# Patient Record
Sex: FEMALE | Race: WHITE
[De-identification: names, ages, dates, MRNs, and addresses within clinical notes are randomized per-mention and may not be internally consistent; named-entity substitution may affect disease eponyms.]

---

## 2017-07-03 ENCOUNTER — HOSPITAL ENCOUNTER (EMERGENCY)
Dept: HOSPITAL 74 - JER | Age: 57
LOS: 1 days | Discharge: HOME | End: 2017-07-04
Payer: COMMERCIAL

## 2017-07-03 VITALS — BODY MASS INDEX: 34.7 KG/M2

## 2017-07-03 VITALS — HEART RATE: 85 BPM | TEMPERATURE: 97.7 F

## 2017-07-03 DIAGNOSIS — E11.9: ICD-10-CM

## 2017-07-03 DIAGNOSIS — G89.29: ICD-10-CM

## 2017-07-03 DIAGNOSIS — I10: ICD-10-CM

## 2017-07-03 DIAGNOSIS — F32.9: ICD-10-CM

## 2017-07-03 DIAGNOSIS — M54.5: Primary | ICD-10-CM

## 2017-07-03 DIAGNOSIS — E78.00: ICD-10-CM

## 2017-07-03 LAB
ALBUMIN SERPL-MCNC: 3.4 G/DL (ref 3.4–5)
ALP SERPL-CCNC: 91 U/L (ref 45–117)
ALT SERPL-CCNC: 29 U/L (ref 12–78)
ANION GAP SERPL CALC-SCNC: 8 MMOL/L (ref 8–16)
AST SERPL-CCNC: 15 U/L (ref 15–37)
BASOPHILS # BLD: 0.5 % (ref 0–2)
BILIRUB SERPL-MCNC: 0.2 MG/DL (ref 0.2–1)
CALCIUM SERPL-MCNC: 9.5 MG/DL (ref 8.5–10.1)
CO2 SERPL-SCNC: 30 MMOL/L (ref 21–32)
CREAT SERPL-MCNC: 0.7 MG/DL (ref 0.55–1.02)
DEPRECATED RDW RBC AUTO: 14.1 % (ref 11.6–15.6)
EOSINOPHIL # BLD: 0.1 % (ref 0–4.5)
GLUCOSE SERPL-MCNC: 210 MG/DL (ref 74–106)
MCH RBC QN AUTO: 29.2 PG (ref 25.7–33.7)
MCHC RBC AUTO-ENTMCNC: 32.8 G/DL (ref 32–36)
MCV RBC: 89 FL (ref 80–96)
NEUTROPHILS # BLD: 79.3 % (ref 42.8–82.8)
PLATELET # BLD AUTO: 254 K/MM3 (ref 134–434)
PMV BLD: 9.7 FL (ref 7.5–11.1)
PROT SERPL-MCNC: 6.5 G/DL (ref 6.4–8.2)
TROPONIN I SERPL-MCNC: < 0.02 NG/ML (ref 0–0.05)
WBC # BLD AUTO: 14.9 K/MM3 (ref 4–10)

## 2017-07-03 PROCEDURE — 3E033NZ INTRODUCTION OF ANALGESICS, HYPNOTICS, SEDATIVES INTO PERIPHERAL VEIN, PERCUTANEOUS APPROACH: ICD-10-PCS

## 2017-07-03 PROCEDURE — 3E033GC INTRODUCTION OF OTHER THERAPEUTIC SUBSTANCE INTO PERIPHERAL VEIN, PERCUTANEOUS APPROACH: ICD-10-PCS

## 2017-07-03 NOTE — PDOC
History of Present Illness





- General


Chief Complaint: Back Pain


Stated Complaint: BACK PAIN/BLOOD PRESSURE PROBLEM


Time Seen by Provider: 07/03/17 21:22


History Source: Patient, Family,  Used (Google Translate.)


Exam Limitations: Language Barrier





- History of Present Illness


Initial Comments: 


07/03/17 21:38





57yo Female patient w/ PmHx: HTN, HLD, NIDDM, Appendectomy, Chronic Back Pain 

presents to ED c/o right flank pain x 1 week radiating to groin. Associated 

Nausea and Dizziness. Patient states she has been taking Ibuprofen for pain 

with no relief. She states attending therapy for back pain and was given an 

injection a few days ago which lasted 1 day with pain worsening. Patient also c/

o frequent and increase urination.











Occurred: reports: other (1 week.)


Severity: reports: severe


Pain Location: reports: back


Method of Injury: No: unknown, assault, direct blow, fall, motor vehicle crash, 

other


Modifying Factors: improves with: pain medication


Loss of Consciousness: no loss of consciousness


Associated Symptoms (Fall): dizziness, nausea/vomiting





Past History





- Travel


Traveled outside of the country in the last 30 days: No


Close contact w/someone who was outside of country & ill: No





- Past Medical History


Allergies/Adverse Reactions: 


 Allergies











Allergy/AdvReac Type Severity Reaction Status Date / Time


 


No Known Drug Allergies Allergy   Verified 07/03/17 19:51











Home Medications: 


Ambulatory Orders





Aspirin Coated [Ecotrin -] 81 mg PO DAILY 01/31/14 


Metoprolol Tartrate [Lopressor -] 100 mg PO DAILY 01/31/14 


Oxycodone HCl [Roxicodone -] 5 mg PO Q4H PRN #20 tablet 01/27/16 


Sennosides [Senna -] 2 tab PO HS PRN #60 tablet 01/27/16 


Azithromycin [Zithromax Tri-Ramiro (3 DAYS) -] 500 mg PO DAILY #3 tablet 04/17/16 


Tramadol HCl 50 mg PO Q6H PRN #20 tablet MDD 4 tabs 07/04/17 








Anemia: No


Asthma: No


Cancer: No


Cardiac Disorders: No


CVA: No


COPD: No


CHF: No


Dementia: No


Diabetes: Yes


GI Disorders: No


 Disorders: Yes (INCONTINENCE)


HTN: Yes


Hypercholesterolemia: No


Liver Disease: No


Psychiatric Problems: Yes (Depression)


Seizures: No


Thyroid Disease: No





- Surgical History


Appendectomy: Yes





- Immunization History


Immunization Up to Date: Yes





- Psycho/Social/Smoking Cessation Hx


Anxiety: No


Suicidal Ideation: No


Smoking History: Never smoked


Have you smoked in the past 12 months: No


Hx Alcohol Use: No


Drug/Substance Use Hx: No


Substance Use Type: None


Hx Substance Use Treatment: No





Trauma Specific PMHX





- Complaint Specific PMHX


Arthritis: No


Back Injury: No


Neck Injury: No


Hx Sacro Iliac Joint Dysfunction: No





**Review of Systems





- Review of Systems


Able to Perform ROS?: Yes


Is the patient limited English proficient: No


Constitutional: No: Chills, Fever


Respiratory: No: Shortness of Breath


Cardiac (ROS): No: Chest Pain, Lightheadedness, Palpitations, Syncope, Chest 

Tightness


ABD/GI: Yes: Nausea.  No: Abdominal Distended, Constipated, Diarrhea, Poor 

Appetite, Poor Fluid Intake, Vomiting, Abdominal cramping


: Yes: Frequency, Flank Pain.  No: Dysuria, Hematuria, Pain, Urgency


Musculoskeletal: Yes: Back Pain


All Other Systems: Reviewed and Negative





*Physical Exam





- Vital Signs


 Last Vital Signs











Temp Pulse Resp BP Pulse Ox


 


 97.7 F   85   18   150/78   98 


 


 07/03/17 19:49  07/03/17 19:49  07/03/17 19:49  07/03/17 19:49  07/03/17 19:49














- Physical Exam


General Appearance: Yes: Nourished, Appropriately Dressed, Moderate Distress.  

No: Apparent Distress, Mild Distress, Severe Distress


Respiratory/Chest: positive: Lungs Clear, Normal Breath Sounds.  negative: 

Chest Tender, Respiratory Distress, Accessory Muscle Use, Labored Respiration, 

Rapid RR, Rhonchi, Stridor, Wheezing, Hyperresonant, Dullness, Plerual Rub


Cardiovascular: positive: Regular Rhythm, Regular Rate


Gastrointestinal/Abdominal: positive: Normal Bowel Sounds, Soft.  negative: 

Distended, Guarding, Rebound, Tenderness


Musculoskeletal: positive: Normal Inspection.  negative: CVA Tenderness, 

Vertebral Tenderness


Extremity: positive: Normal Capillary Refill, Normal Inspection, Normal Range 

of Motion.  negative: Pedal Edema, Swelling, Calf Tenderness, Erythema, 

Inflammation


Integumentary: positive: Normal Color, Dry, Warm


Neurologic: positive: CNs II-XII NML intact, Fully Oriented, Alert, Normal Mood/

Affect, Normal Response, Motor Strength 5/5





ED Treatment Course





- LABORATORY


CBC & Chemistry Diagram: 


 07/03/17 22:10





 07/03/17 22:10





Medical Decision Making





- Medical Decision Making





07/04/17 04:53





The Drug Utilization Report below displays all of the controlled substance 

prescriptions, if any, that your patient has filled in the last twelve months. 

The information displayed on this report is compiled from pharmacy submissions 

to the Department, and accurately reflects the information as submitted by the 

pharmacies.





This report was requested by: Jose Garrett | Reference #: [id number]





There are no results for the search terms that you entered.





*DC/Admit/Observation/Transfer


Diagnosis at time of Disposition: 


 Exacerbation of chronic back pain





- Discharge Dispostion


Disposition: HOME


Condition at time of disposition: Improved


Admit: No





- Prescriptions


Prescriptions: 


Tramadol HCl 50 mg PO Q6H PRN #20 tablet MDD 4 tabs


 PRN Reason: Severe Pain





- Referrals


Referrals: 


Jimmie Yoon MD [Staff Physician] - 





- Patient Instructions


Printed Discharge Instructions:  DI for Low Back Pain


Additional Instructions: 


FOLLOW UP WITH DR. YOON (ORTHOPEDIC) OR YOUR ORTHOPEDIST REGARDING BACK PAIN 

NOT RELIEVED BY NORMAL MEANS. TAKE MEDICATIONS AS PRESCRIBED. DO NOT DRIVE, 

DRINK ALCOHOL, OR OPERATE HEAVY MACHINERY WHILE TAKING TRAMADOL. WARM COMPRESS 

AS NEEDED. RETURN IF ANY CONCERNS. YOUR DOCTOR NEEDS TO ORDER MRI IF AN MRI IS 

NEEDED. WE HAVE PROVIDED YOU WITH RESULTS OF YOUR CT-SCAN TONIGHT.


Print Language: ENGLISH

## 2017-07-04 VITALS — DIASTOLIC BLOOD PRESSURE: 85 MMHG | SYSTOLIC BLOOD PRESSURE: 127 MMHG

## 2017-07-04 LAB
APPEARANCE UR: CLEAR
BILIRUB UR STRIP.AUTO-MCNC: NEGATIVE MG/DL
COLOR UR: (no result)
KETONES UR QL STRIP: NEGATIVE
LEUKOCYTE ESTERASE UR QL STRIP.AUTO: NEGATIVE
NITRITE UR QL STRIP: NEGATIVE
PH UR: 8 [PH] (ref 5–8)
PROT UR QL STRIP: (no result)
PROT UR QL STRIP: NEGATIVE
RBC # UR STRIP: NEGATIVE /UL
SP GR UR: 1.02 (ref 1–1.02)
UROBILINOGEN UR STRIP-MCNC: NEGATIVE E.U./DL (ref 0.2–1)

## 2017-11-07 ENCOUNTER — HOSPITAL ENCOUNTER (OUTPATIENT)
Dept: HOSPITAL 74 - JASU-SURG | Age: 57
Discharge: HOME | End: 2017-11-07
Attending: PHYSICAL MEDICINE & REHABILITATION
Payer: COMMERCIAL

## 2017-11-07 VITALS — HEART RATE: 82 BPM | SYSTOLIC BLOOD PRESSURE: 150 MMHG | DIASTOLIC BLOOD PRESSURE: 82 MMHG

## 2017-11-07 VITALS — BODY MASS INDEX: 32.9 KG/M2

## 2017-11-07 VITALS — TEMPERATURE: 97.9 F

## 2017-11-07 DIAGNOSIS — M54.16: Primary | ICD-10-CM

## 2017-11-07 DIAGNOSIS — M54.5: ICD-10-CM

## 2017-11-07 PROCEDURE — 3E0R33Z INTRODUCTION OF ANTI-INFLAMMATORY INTO SPINAL CANAL, PERCUTANEOUS APPROACH: ICD-10-PCS | Performed by: PHYSICAL MEDICINE & REHABILITATION

## 2017-11-07 PROCEDURE — 3E0R3BZ INTRODUCTION OF ANESTHETIC AGENT INTO SPINAL CANAL, PERCUTANEOUS APPROACH: ICD-10-PCS | Performed by: PHYSICAL MEDICINE & REHABILITATION

## 2017-11-07 PROCEDURE — B01BYZZ FLUOROSCOPY OF SPINAL CORD USING OTHER CONTRAST: ICD-10-PCS | Performed by: PHYSICAL MEDICINE & REHABILITATION

## 2017-11-07 NOTE — PROC
Procedure Note


Procedure: 


Date of service: 11/07/2017





Preoperative Diagnosis: Low back pain and lumbar radiculopathy on right 


Postoperative Diagnosis: Same


Procedure Performed: Lumbar Epidural Steroid Injection (LESI) on Right  L4-5

with dye under Fluoroscopy


Anesthesia: Local / MAC


Anesthesiologist: Dr. Fam





Procedure: I discussed with the patient in detail about the risks, benefits, 

and alternatives to treatment not only limited to infection, headache, numbness

, weakness, and injury to nerves, blood vessels and muscles. The patient 

understood, agreed and signed the written consent. The patient was placed in 

the prone position with the head, abdomen and legs supported with the pillows. 

The lumbosacral area was prepped and draped with Betadine times three in a 

sterile fashion. Lumbar vertebrae were identified under the C-arm. At L4-5 

level on the right side, 3 ml of 1 % Lidocaine was infiltrated into the skin 

and subcutaneous tissue. A 3  inch, #20 gauge Tuohy needle was advanced to the 

epidural space with loss of resistance technique under fluoroscopic guidance. 

Aspiration was negative for cerebrospinal fluid and blood. 2ml of Omnipaque (

radio-opaque dye) was injected to confirm the tip of the needle into epidural 

space and spread of dye. There was no CSF or vascular spread. The spread of dye 

was noted cranially and caudally on epidurogram. Aspiration was done again 

which was negative. 


A solution of 2.0 ml of Celestone 2 ml of 0.25% Marcaine  and a total of 4 ml 

was injected slowly. While Tuohy needle was withdrawn 2.0 ml of 1 % Lidocaine 

was infiltrated. Bleeding was checked. Betadine was wiped off. A sterile 

bandage was placed. The patient tolerated the procedure well. There were no 

immediate complications. The patient was transferred to the recovery room. The 

patient was observed for some time and discharged as per ASU criteria. The 

patient was told to apply ice at the injection site. Follow up appointment was 

given and also call my office at [phone]. If there is any problem, call my 

office or report to Emergency Room.





Ted Lindsey M.D.

## 2018-05-20 ENCOUNTER — HOSPITAL ENCOUNTER (EMERGENCY)
Dept: HOSPITAL 74 - JERFT | Age: 58
Discharge: HOME | End: 2018-05-20
Payer: COMMERCIAL

## 2018-05-20 VITALS — DIASTOLIC BLOOD PRESSURE: 78 MMHG | SYSTOLIC BLOOD PRESSURE: 127 MMHG | TEMPERATURE: 98.3 F | HEART RATE: 95 BPM

## 2018-05-20 VITALS — BODY MASS INDEX: 34.7 KG/M2

## 2018-05-20 DIAGNOSIS — I10: ICD-10-CM

## 2018-05-20 DIAGNOSIS — F32.9: ICD-10-CM

## 2018-05-20 DIAGNOSIS — E11.9: ICD-10-CM

## 2018-05-20 DIAGNOSIS — T78.40XA: Primary | ICD-10-CM

## 2018-05-20 DIAGNOSIS — L50.0: ICD-10-CM

## 2018-05-20 DIAGNOSIS — Z79.84: ICD-10-CM

## 2018-05-20 NOTE — PDOC
History of Present Illness





- General


Chief Complaint: Allergic Reaction


Stated Complaint: ALLERGIC REACTION


Time Seen by Provider: 05/20/18 13:09


History Source: Patient





- History of Present Illness


Timing/Duration: reports: other


Location: reports: extremities, torso





Past History





- Past Medical History


Allergies/Adverse Reactions: 


 Allergies











Allergy/AdvReac Type Severity Reaction Status Date / Time


 


No Known Drug Allergies Allergy   Verified 05/20/18 13:03











Home Medications: 


Ambulatory Orders





Aspirin Coated [Ecotrin -] 81 mg PO DAILY 01/31/14 


Chlorthalidone 25 mg PO ASDIR 05/20/18 


Diphenhydramine HCl [Benadryl -] 25 mg PO Q6H #28 capsule 05/20/18 


Escitalopram Oxalate [Lexapro -] 10 mg PO DAILY 05/20/18 


Gabapentin 300 mg PO ASDIR 05/20/18 


Lisinopril [Zestril] 2.5 mg PO ASDIR 05/20/18 


Loratadine [Claritin] 10 mg PO DAILY #14 tablet 05/20/18 


Loratadine [Claritin] 10 mg PO DAILY #14 tablet 05/20/18 


Metformin HCl 850 mg PO ASDIR 05/20/18 








Anemia: No


Asthma: No


Cancer: No


Cardiac Disorders: No


CVA: No


COPD: No


CHF: No


Dementia: No


Diabetes: Yes


GI Disorders: No


 Disorders: Yes (INCONTINENCE)


HTN: Yes


Hypercholesterolemia: No


Liver Disease: No


Psychiatric Problems: Yes (Depression)


Seizures: No


Thyroid Disease: No





- Surgical History


Appendectomy: Yes





- Immunization History


Immunization Up to Date: Yes





- Suicide/Smoking/Psychosocial Hx


Smoking History: Never smoked


Have you smoked in the past 12 months: No


Hx Alcohol Use: No


Drug/Substance Use Hx: No


Substance Use Type: None


Hx Substance Use Treatment: No





**Review of Systems





- Review of Systems


Constitutional: No: Chills, Fever


Respiratory: No: Shortness of Breath, Wheezing


Integumentary: Yes: Pruritus, Rash





*Physical Exam





- Vital Signs


 Last Vital Signs











Temp Pulse Resp BP Pulse Ox


 


 98.3 F   95 H  18   127/78   99 


 


 05/20/18 13:00  05/20/18 13:00  05/20/18 13:00  05/20/18 13:00  05/20/18 13:00














- Physical Exam


General Appearance: Yes: Appropriately Dressed.  No: Apparent Distress


HEENT: positive: Normal Voice.  negative: Muffled/Hoarse voice


Neck: positive: Supple.  negative: Stridor


Respiratory/Chest: negative: Respiratory Distress


Integumentary: positive: Dry, Warm, Hives


Neurologic: positive: Fully Oriented, Alert, Normal Mood/Affect





Medical Decision Making





- Medical Decision Making





05/20/18 13:32


57-year-old female, history of diabetes and hypertension, here with pruritic 

rash 3 days.  Patient has no known drug or food allergies and denies any 

inciting factors at this time.  No sick contacts or recent travel.  No 

respiratory symptoms.  Has not taking anything for rash.  Patient well-

appearing and stable with rash consistent with hives to upper and lower 

extremities and trunk.  Exam otherwise unremarkable.  Dose of Claritin given in 

ED.  DC with same and PMD follow-up as needed





*DC/Admit/Observation/Transfer


Diagnosis at time of Disposition: 


 Hives








- Discharge Dispostion


Disposition: HOME


Condition at time of disposition: Good





- Prescriptions


Prescriptions: 


Diphenhydramine HCl [Benadryl -] 25 mg PO Q6H #28 capsule


Loratadine [Claritin] 10 mg PO DAILY #14 tablet


Loratadine [Claritin] 10 mg PO DAILY #14 tablet





- Referrals





- Patient Instructions


Printed Discharge Instructions:  Hives


Additional Instructions: 


It appears that you have an allergic reaction at this time.  Take Claritin once 

daily as directed and you can take Benadryl at night to assist with sleep as 

needed.


If symptoms worsen, return to ER immediately.  Otherwise follow-up with your PMD





- Post Discharge Activity

## 2018-07-29 ENCOUNTER — HOSPITAL ENCOUNTER (EMERGENCY)
Dept: HOSPITAL 74 - JER | Age: 58
LOS: 1 days | Discharge: HOME | End: 2018-07-30
Payer: COMMERCIAL

## 2018-07-29 VITALS — BODY MASS INDEX: 32.9 KG/M2

## 2018-07-29 DIAGNOSIS — N39.0: Primary | ICD-10-CM

## 2018-07-29 DIAGNOSIS — R19.7: ICD-10-CM

## 2018-07-29 PROCEDURE — 3E033GC INTRODUCTION OF OTHER THERAPEUTIC SUBSTANCE INTO PERIPHERAL VEIN, PERCUTANEOUS APPROACH: ICD-10-PCS

## 2018-07-29 PROCEDURE — 3E0337Z INTRODUCTION OF ELECTROLYTIC AND WATER BALANCE SUBSTANCE INTO PERIPHERAL VEIN, PERCUTANEOUS APPROACH: ICD-10-PCS

## 2018-07-29 PROCEDURE — 3E03329 INTRODUCTION OF OTHER ANTI-INFECTIVE INTO PERIPHERAL VEIN, PERCUTANEOUS APPROACH: ICD-10-PCS

## 2018-07-29 PROCEDURE — 3E02329 INTRODUCTION OF OTHER ANTI-INFECTIVE INTO MUSCLE, PERCUTANEOUS APPROACH: ICD-10-PCS

## 2018-07-29 NOTE — PDOC
History of Present Illness





<DilshadRosyaayush Griffin - Last Filed: 07/29/18 23:55>





- General


History Source: Patient


Exam Limitations: No Limitations





- History of Present Illness


Initial Comments: 





07/30/18 00:33


The patient is a 57 year old female, with a significant PMH of HDL non insulin 

independent diabetes, HTN and depression who presents to the emergency 

department with nausea and vomiting that began 1 day ago. The patient states 

nausea and vomiting is accompanied with RUQ/RLQ pain, chills, dysuria  and  3 

loose stools. The patient mentions  she went to her clinic yesterday and was 

prescribed pyridium. The patient denies chest pain, shortness of breath, 

headache and dizziness.Denies, constipation, frequency, urgency and hematuria.





Allergies: NKDA


Past surgical history: Appendectomy 


Social history: None reported


PCP: None reported 








<Husam Vera - Last Filed: 07/30/18 00:33>





<Karine Fletcher - Last Filed: 07/30/18 06:09>





- General


Chief Complaint: Pain


Stated Complaint: VOMIT/STOMACH PAIN


Time Seen by Provider: 07/29/18 23:37





Past History





- Past Medical History


Anemia: No


Asthma: No


Cancer: No


Cardiac Disorders: No


CVA: No


COPD: No


CHF: No


DVT: No


Dementia: No


Diabetes: Yes (NIDDM)


GI Disorders: No


 Disorders: Yes (INCONTINENCE)


HTN: Yes


Hypercholesterolemia: Yes


Liver Disease: No


Psychiatric Problems: Yes (Depression)


Seizures: No


Thyroid Disease: No





- Surgical History


Appendectomy: Yes





- Immunization History


Immunization Up to Date: Yes





- Suicide/Smoking/Psychosocial Hx


Smoking History: Never smoked


Have you smoked in the past 12 months: No


Information on smoking cessation initiated: No


Hx Alcohol Use: No


Drug/Substance Use Hx: No


Substance Use Type: None


Hx Substance Use Treatment: No





<Rosy Yung - Last Filed: 07/29/18 23:55>





<Husam Vera - Last Filed: 07/30/18 00:33>





<Karine Fletcher - Last Filed: 07/30/18 06:09>





- Past Medical History


Allergies/Adverse Reactions: 


 Allergies











Allergy/AdvReac Type Severity Reaction Status Date / Time


 


No Known Drug Allergies Allergy   Verified 05/20/18 13:03











Home Medications: 


Ambulatory Orders





Aspirin Coated [Ecotrin -] 81 mg PO DAILY 01/31/14 


Chlorthalidone 25 mg PO ASDIR 05/20/18 


Diphenhydramine HCl [Benadryl -] 25 mg PO Q6H #28 capsule 05/20/18 


Escitalopram Oxalate [Lexapro -] 10 mg PO DAILY 05/20/18 


Gabapentin 300 mg PO ASDIR 05/20/18 


Lisinopril [Zestril] 2.5 mg PO ASDIR 05/20/18 


Loratadine [Claritin] 10 mg PO DAILY #14 tablet 05/20/18 


Loratadine [Claritin] 10 mg PO DAILY #14 tablet 05/20/18 


Metformin HCl 850 mg PO ASDIR 05/20/18 


Cephalexin [Keflex] 500 mg PO TID #21 capsule 07/30/18 











**Review of Systems





- Review of Systems


Able to Perform ROS?: Yes


Comments:: 





07/30/18 00:33


CONSTITUTIONAL: 


Present: Fever, chills


Absent: diaphoresis, generalized weakness, malaise, loss of appetite


HEENT: 


Absent: rhinorrhea, nasal congestion, throat pain, throat swelling, difficulty 

swallowing, mouth swelling, ear pain, eye pain, visual Changes


CARDIOVASCULAR: 


Absent: chest pain, syncope, palpitations, irregular heart rate, lightheadedness

, peripheral edema


RESPIRATORY: 


Absent: cough, shortness of breath, dyspnea with exertion, orthopnea, wheezing, 

stridor, hemoptysis


GASTROINTESTINAL:


Present: Nausea, vomiting


Absent: abdominal pain, abdominal distension,diarrhea, constipation, melena, 

hematochezia


GENITOURINARY: 


Present:dysuria 


Absent:  frequency, urgency, hesitancy, hematuria, flank pain, genital pain


SKIN: 


Absent: rash, itching, pallor


ENDOCRINE:


Absent: unexplained weight gain, unexplained weight loss, heat intolerance, 

cold intolerance


NEUROLOGIC: 


Absent: headache, focal weakness or paresthesias, dizziness, unsteady gait, 

seizure, mental status changes, bladder or bowel incontinence


PSYCHIATRIC: 


Absent: anxiety, depression, suicidal or homicidal ideation, hallucinations.











<Husam Vera - Last Filed: 07/30/18 00:33>





*Physical Exam





- Vital Signs


 Last Vital Signs











Temp Pulse Resp BP Pulse Ox


 


 98.1 F   93 H  20   149/86   98 


 


 07/29/18 22:17  07/29/18 22:17  07/29/18 22:17  07/29/18 22:17  07/29/18 22:17














<Rosy Yung - Last Filed: 07/29/18 23:55>





- Vital Signs


 Last Vital Signs











Temp Pulse Resp BP Pulse Ox


 


 98.1 F   93 H  20   149/86   98 


 


 07/29/18 22:17  07/29/18 22:17  07/29/18 22:17  07/29/18 22:17  07/29/18 22:17














- Physical Exam


Comments: 





07/30/18 00:34


GENERAL:


Well developed, well nourished. Awake and alert. No acute distress.


HEENT:


Normocephalic, atraumatic. PERRLA, EOMI. No conjunctival pallor. Sclera are non-

icteric. Moist mucous membranes. Oropharynx is clear.


NECK: 


Supple. Full ROM. No JVD. Carotid pulses 2+ and symmetric, without bruits. No 

thyromegaly. No lymphadenopathy.


CARDIOVASCULAR:


Regular rate and rhythm. No murmurs, rubs, or gallops. Distal pulses are 2+ and 

symmetric. 


PULMONARY: 


No evidence of respiratory distress. Lungs clear to auscultation bilaterally. 

No wheezing, rales or rhonchi.


ABDOMINAL:


+RUQ/ RLQ tenderness to palpation. Soft. No rebound or guarding. No 

organomegaly. Normoactive bowel sounds. 


MUSCULOSKELETAL 


Normal range of motion at all joints. No bony deformities or tenderness. No CVA 

tenderness.


EXTREMITIES: 


No cyanosis. No clubbing. No edema. No calf tenderness.


SKIN: 


Warm and dry. Normal capillary refill. No rashes. No jaundice. 


NEUROLOGICAL: 


Alert, awake, appropriate. Cranial nerves 2-12 intact. No deficits to light 

touch and temperature in face, upper extremities and lower extremities. 

Normoreflexic in the upper and lower extremities. Normal speech. 


PSYCHIATRIC: 


Cooperative. Good eye contact. Appropriate mood and affect.








<Husam Vera - Last Filed: 07/30/18 00:33>





- Vital Signs


 Last Vital Signs











Temp Pulse Resp BP Pulse Ox


 


 98.1 F   93 H  20   149/86   98 


 


 07/29/18 22:17  07/29/18 22:17  07/29/18 22:17  07/29/18 22:17  07/29/18 22:17














<Karine Fletcher - Last Filed: 07/30/18 06:09>





ED Treatment Course





- LABORATORY


CBC & Chemistry Diagram: 


 07/30/18 01:28





 07/30/18 01:28





- ADDITIONAL ORDERS


Additional order review: 


 Laboratory  Results











  07/30/18 07/30/18





  01:28 01:28


 


Sodium  144 


 


Potassium  3.5 


 


Chloride  105 


 


Carbon Dioxide  30 


 


Anion Gap  9 


 


BUN  17 


 


Creatinine  0.7 


 


Creat Clearance w eGFR  > 60 


 


Random Glucose  140 H 


 


Calcium  9.7 


 


Total Bilirubin  0.4 


 


AST  19 


 


ALT  32 


 


Alkaline Phosphatase  122 H 


 


Total Protein  7.0 


 


Albumin  3.8 


 


Lipase  85 


 


Urine Color   Inessa


 


Urine Appearance   Clear


 


Urine pH   6.0  D


 


Ur Specific Gravity   1.024


 


Urine Protein   Negative


 


Urine Glucose (UA)   Negative


 


Urine Ketones   Negative


 


Urine Blood   Negative


 


Urine Nitrite   Positive


 


Urine Bilirubin   Negative


 


Urine Urobilinogen   4.0 e.u/dl H


 


Ur Leukocyte Esterase   Negative


 


Urine WBC (Auto)   13


 


Urine RBC (Auto)   1


 


Ur Epithelial Cells   Few


 


Urine Mucus   Few








 











  07/30/18





  01:28


 


RBC  4.75


 


MCV  87.6


 


MCHC  34.2


 


RDW  13.7


 


MPV  9.6


 


Neutrophils %  84.5 H


 


Lymphocytes %  10.9  D


 


Monocytes %  4.0


 


Eosinophils %  0.4  D


 


Basophils %  0.2














- Medications


Given in the ED: 


ED Medications














Discontinued Medications














Generic Name Dose Route Start Last Admin





  Trade Name Erick  PRN Reason Stop Dose Admin


 


Sodium Chloride  1,000 mls @ 1,000 mls/hr  07/29/18 23:55  07/30/18 01:23





  Normal Saline -  IV  07/30/18 00:54  1,000 mls/hr





  ASDIR STA   Administration





     





     





     





     


 


Morphine Sulfate  2 mg  07/29/18 23:55  07/30/18 01:32





  Morphine Injection -  IVPUSH  07/29/18 23:56  2 mg





  ONCE STA   Administration





     





     





     





     


 


Ondansetron HCl  4 mg  07/29/18 23:55  07/30/18 01:33





  Zofran Injection  IVPUSH  07/29/18 23:56  4 mg





  ONCE STA   Administration





     





     





     





     














<Karine Fletcher - Last Filed: 07/30/18 06:09>





Medical Decision Making





- Medical Decision Making





07/30/18 05:54


Patient Name: MEGHNA CALIXTO


THIS IS A PRELIMINARY REPORT FROM IMAGING ON CALL


DATE OF SERVICE: 2018-07-30 02:24:25


IMAGES: [id number]


EXAM: ABDOMEN \T\ PELVIS CT WITH CONTR


HISTORY: Right lower quadrant pain


COMPARISON: None.


FINDINGS: Lung bases are clear. The visualized cardiac chambers are normal size 

and


configuration. Normal liver, gallbladder, pancreas, spleen, adrenal glands and 

kidneys. The


stomach and small bowel bowel are normal. There is scattered liquid stool 

without colonic wall


thickening which may indicate a diarrheal illness. There is no aortic aneurysm. 

There is no


significant retroperitoneal lymphadenopathy.


Status post appendectomy. The uterus and adnexal structures are normal. Urinary 

bladder is


unremarkable. There is no pelvic free fluid. No discrete pelvic lymphadenopathy 

is identified.


IMPRESSION: Possible diarrheal illness without colonic wall thickening.


Individualized dose optimization techniques were used for this CT.





07/30/18 06:08


Pt will be treated with rocephin in the ER for UTI and she will go home with 

keflex.





<Karine Fletcher - Last Filed: 07/30/18 06:09>





*DC/Admit/Observation/Transfer





<Rosy Yung - Last Filed: 07/29/18 23:55>





- Attestations


Scribe Attestion: 





07/30/18 00:35





Documentation prepared by Husam Vera, acting as medical scribe for Roys Yung MD.





<Husam Vera - Last Filed: 07/30/18 00:33>





- Discharge Dispostion


Decision to Admit order: No





<Karine Fletcher - Last Filed: 07/30/18 06:09>


Diagnosis at time of Disposition: 


 Diarrhea, UTI (urinary tract infection)








- Discharge Dispostion


Disposition: HOME


Condition at time of disposition: Stable





- Prescriptions


Prescriptions: 


Cephalexin [Keflex] 500 mg PO TID #21 capsule





- Referrals


Referrals: 


Evette Rendon [Primary Care Provider] - 





- Patient Instructions


Printed Discharge Instructions:  Urinary Tract Infection, Diarrhea, Probiotics 

May Decrease Intensity and Duration of Diarrhea Due to Infection





- Post Discharge Activity

## 2018-07-30 VITALS — SYSTOLIC BLOOD PRESSURE: 129 MMHG | TEMPERATURE: 97.9 F | DIASTOLIC BLOOD PRESSURE: 72 MMHG | HEART RATE: 78 BPM

## 2018-07-30 LAB
ALBUMIN SERPL-MCNC: 3.8 G/DL (ref 3.4–5)
ALP SERPL-CCNC: 122 U/L (ref 45–117)
ALT SERPL-CCNC: 32 U/L (ref 12–78)
ANION GAP SERPL CALC-SCNC: 9 MMOL/L (ref 8–16)
APPEARANCE UR: CLEAR
AST SERPL-CCNC: 19 U/L (ref 15–37)
BASOPHILS # BLD: 0.2 % (ref 0–2)
BILIRUB SERPL-MCNC: 0.4 MG/DL (ref 0.2–1)
BILIRUB UR STRIP.AUTO-MCNC: NEGATIVE MG/DL
BUN SERPL-MCNC: 17 MG/DL (ref 7–18)
CALCIUM SERPL-MCNC: 9.7 MG/DL (ref 8.5–10.1)
CHLORIDE SERPL-SCNC: 105 MMOL/L (ref 98–107)
CO2 SERPL-SCNC: 30 MMOL/L (ref 21–32)
COLOR UR: (no result)
CREAT SERPL-MCNC: 0.7 MG/DL (ref 0.55–1.02)
DEPRECATED RDW RBC AUTO: 13.7 % (ref 11.6–15.6)
EOSINOPHIL # BLD: 0.4 % (ref 0–4.5)
EPITH CASTS URNS QL MICRO: (no result) /HPF
GLUCOSE SERPL-MCNC: 140 MG/DL (ref 74–106)
HCT VFR BLD CALC: 41.6 % (ref 32.4–45.2)
HGB BLD-MCNC: 14.2 GM/DL (ref 10.7–15.3)
KETONES UR QL STRIP: NEGATIVE
LEUKOCYTE ESTERASE UR QL STRIP.AUTO: NEGATIVE
LIPASE SERPL-CCNC: 85 U/L (ref 73–393)
LYMPHOCYTES # BLD: 10.9 % (ref 8–40)
MCH RBC QN AUTO: 29.9 PG (ref 25.7–33.7)
MCHC RBC AUTO-ENTMCNC: 34.2 G/DL (ref 32–36)
MCV RBC: 87.6 FL (ref 80–96)
MONOCYTES # BLD AUTO: 4 % (ref 3.8–10.2)
MUCOUS THREADS URNS QL MICRO: (no result)
NEUTROPHILS # BLD: 84.5 % (ref 42.8–82.8)
NITRITE UR QL STRIP: POSITIVE
PH UR: 6 [PH] (ref 5–8)
PLATELET # BLD AUTO: 237 K/MM3 (ref 134–434)
PMV BLD: 9.6 FL (ref 7.5–11.1)
POTASSIUM SERPLBLD-SCNC: 3.5 MMOL/L (ref 3.5–5.1)
PROT SERPL-MCNC: 7 G/DL (ref 6.4–8.2)
PROT UR QL STRIP: NEGATIVE
PROT UR QL STRIP: NEGATIVE
RBC # BLD AUTO: 4.75 M/MM3 (ref 3.6–5.2)
SODIUM SERPL-SCNC: 144 MMOL/L (ref 136–145)
SP GR UR: 1.02 (ref 1–1.03)
UROBILINOGEN UR STRIP-MCNC: (no result) MG/DL (ref 0.2–1)
WBC # BLD AUTO: 10.8 K/MM3 (ref 4–10)

## 2018-11-19 ENCOUNTER — HOSPITAL ENCOUNTER (EMERGENCY)
Dept: HOSPITAL 74 - JERFT | Age: 58
Discharge: HOME | End: 2018-11-19
Payer: COMMERCIAL

## 2018-11-19 VITALS — DIASTOLIC BLOOD PRESSURE: 89 MMHG | TEMPERATURE: 97.3 F | SYSTOLIC BLOOD PRESSURE: 167 MMHG | HEART RATE: 94 BPM

## 2018-11-19 VITALS — BODY MASS INDEX: 34 KG/M2

## 2018-11-19 DIAGNOSIS — M43.26: ICD-10-CM

## 2018-11-19 DIAGNOSIS — M54.5: Primary | ICD-10-CM

## 2018-11-19 DIAGNOSIS — G89.29: ICD-10-CM

## 2018-11-19 DIAGNOSIS — Z79.84: ICD-10-CM

## 2018-11-19 DIAGNOSIS — F32.9: ICD-10-CM

## 2018-11-19 DIAGNOSIS — E78.00: ICD-10-CM

## 2018-11-19 DIAGNOSIS — E11.9: ICD-10-CM

## 2018-11-19 DIAGNOSIS — I10: ICD-10-CM

## 2018-11-19 PROCEDURE — 3E0233Z INTRODUCTION OF ANTI-INFLAMMATORY INTO MUSCLE, PERCUTANEOUS APPROACH: ICD-10-PCS

## 2018-11-19 NOTE — PDOC
History of Present Illness





- General


Chief Complaint: Back Pain


Stated Complaint: BACK PAIN


Time Seen by Provider: 11/19/18 11:07


History Source: Patient


Exam Limitations: Clinical Condition





- History of Present Illness


Initial Comments: 





11/19/18 11:51


Patient with history of herniated disc a lumbar spine status post spinal fusion 

2 months ago present with complaint of lower back pain radiating to the side of 

lower left side. Patient has been taking the prescribed Percocet by report has 

not been helping with the pain. Patient denies any numbness or tingling 

sensation. Patient report patient keeps her up at night and unable to sleep due 

to pain which has been going on for 3 days now. Patient reports she fell 2 

weeks ago and not sure if that was causing the pain to worsen. Patient denies 

any other symptoms


Timing/Duration: other (3 days)





Past History





- Past Medical History


Allergies/Adverse Reactions: 


 Allergies











Allergy/AdvReac Type Severity Reaction Status Date / Time


 


No Known Drug Allergies Allergy   Verified 11/19/18 09:26











Home Medications: 


Ambulatory Orders





Aspirin Coated [Ecotrin -] 81 mg PO DAILY 01/31/14 


Chlorthalidone 25 mg PO ASDIR 05/20/18 


Escitalopram Oxalate [Lexapro -] 10 mg PO DAILY 05/20/18 


Gabapentin 300 mg PO ASDIR 05/20/18 


Lisinopril [Zestril] 2.5 mg PO ASDIR 05/20/18 


metFORMIN HCL [Metformin HCl] 850 mg PO ASDIR 05/20/18 


Gabapentin [Neurontin] 300 mg PO Q8H PRN #20 capsule 11/19/18 


Methocarbamol [Robaxin -] 500 mg PO TID #21 tablet 11/19/18 








Anemia: No


Asthma: No


Cancer: No


Cardiac Disorders: No


CVA: No


COPD: No


CHF: No


DVT: No


Dementia: No


Diabetes: Yes (NIDDM)


GI Disorders: No


 Disorders: Yes (INCONTINENCE)


HTN: Yes


Hypercholesterolemia: Yes


Liver Disease: No


Psychiatric Problems: Yes (Depression)


Seizures: No


Thyroid Disease: No





- Surgical History


Appendectomy: Yes





- Immunization History


Immunization Up to Date: Yes





- Suicide/Smoking/Psychosocial Hx


Smoking History: Never smoked


Have you smoked in the past 12 months: No


Information on smoking cessation initiated: No


Hx Alcohol Use: No


Drug/Substance Use Hx: No


Substance Use Type: None


Hx Substance Use Treatment: No





**Review of Systems





- Review of Systems


Able to Perform ROS?: Yes


Is the patient limited English proficient: No


Constitutional: No: Weakness


HEENTM: No: Double Vision


Respiratory: No: Symptoms reported


Cardiac (ROS): No: Symptoms Reported


ABD/GI: No: Symptoms Reported


: No: Dysuria, Frequency, Flank Pain, Incontinence, Urgency


Musculoskeletal: Yes: See HPI, Back Pain (lwoer back), Muscle Pain (lower back 

radiating to left side).  No: Joint Swelling, Muscle Weakness, Neck Pain


Neurological: No: Symptoms reported, Numbness, Paresthesia, Tingling


All Other Systems: Reviewed and Negative





*Physical Exam





- Vital Signs


 Last Vital Signs











Temp Pulse Resp BP Pulse Ox


 


 97.3 F L  94 H  16   167/89   100 


 


 11/19/18 09:23  11/19/18 09:23  11/19/18 09:23  11/19/18 09:23  11/19/18 09:23














- Physical Exam


Comments: 





11/19/18 11:54


GENERAL:


Well developed, well nourished. Awake and alert. No acute distress.


CARDIOVASCULAR:


Regular rate and rhythm. No murmurs, rubs, or gallops. 


PULMONARY: 


No evidence of respiratory distress. Lungs clear to auscultation bilaterally. 

No wheezing, rales or rhonchi.


ABDOMINAL:


Soft. Non-tender. Non-distended. No rebound or guarding. No organomegaly. 

Normoactive bowel sounds


MUSCULOSKELETAL : moderate tenderness over posterior paravertebral muscle L4-S2 

on left side. No bony deformities 


EXTREMITIES: 


No cyanosis. No clubbing. No edema. No calf tenderness.


SKIN: 


well healed linear surgical incisional scar on lumbar spine . no erythema to 

incision site. no evidence of infection to incision site


NEUROLOGICAL: 


Alert, awake, appropriate.  No motor deficits in the  lower extremities.   

negative strainght left raise. Gait is normal with cane.


PSYCHIATRIC: 


Cooperative. Good eye contact. Appropriate mood and affect.


General Appearance: Yes: Nourished, Appropriately Dressed, Mild Distress





ED Treatment Course





- RADIOLOGY


Radiology Studies Ordered: 














 Category Date Time Status


 


 SPINE-LUMBAR SACRAL [RAD] Stat Radiology  11/19/18 11:16 Taken














- Medications


Given in the ED: 


ED Medications














Discontinued Medications














Generic Name Dose Route Start Last Admin





  Trade Name Freq  PRN Reason Stop Dose Admin


 


Cyclobenzaprine HCl  10 mg  11/19/18 11:16  11/19/18 11:24





  Flexeril -  PO  11/19/18 11:17  10 mg





  ONCE ONE   Administration





     





     





     





     


 


Ketorolac Tromethamine  60 mg  11/19/18 11:15  11/19/18 11:24





  Toradol Injection -  IM  11/19/18 11:16  60 mg





  ONCE ONE   Administration





     





     





     





     














Medical Decision Making





- Medical Decision Making





11/19/18 11:56


Patient with history of herniated disc status post spinal fusion 2 months ago 

present with complaint of low back pain. X-ray of the lumbosacral shows 2 

screws over fourth and fifth lumbar spine otherwise no acute pathology. Patient 

discharged home muscle relaxer and Neurontin with orthopedic surgeon follow-up. 

Cyclobenzaprine 10 mg by mouth given and Toradol 60 mg IM for pain.





*DC/Admit/Observation/Transfer


Diagnosis at time of Disposition: 


 Exacerbation of chronic back pain





Lumbago


Qualifiers:


 Chronicity: chronic Back pain laterality: bilateral Sciatica presence: without 

sciatica Qualified Code(s): M54.5 - Low back pain; G89.29 - Other chronic pain








- Discharge Dispostion


Disposition: HOME


Condition at time of disposition: Stable


Decision to Admit order: No





- Prescriptions


Prescriptions: 


Gabapentin [Neurontin] 300 mg PO Q8H PRN #20 capsule


 PRN Reason: Back Pain


Methocarbamol [Robaxin -] 500 mg PO TID #21 tablet





- Referrals


Referrals: 


Joesph Saenz MD, FAANS [Staff Physician] - 





- Patient Instructions


Printed Discharge Instructions:  Managing Chronic Low Back Pain, Activity May 

Be Better then Rest for Low Back Pain Recovery


Additional Instructions: 


take medications as prescribed. follow-up with neurosurgeon who did back  

surgery for reassessment





- Post Discharge Activity

## 2018-12-26 ENCOUNTER — HOSPITAL ENCOUNTER (EMERGENCY)
Dept: HOSPITAL 74 - JER | Age: 58
Discharge: HOME | End: 2018-12-26
Payer: COMMERCIAL

## 2018-12-26 VITALS — HEART RATE: 74 BPM | SYSTOLIC BLOOD PRESSURE: 145 MMHG | DIASTOLIC BLOOD PRESSURE: 76 MMHG | TEMPERATURE: 98.2 F

## 2018-12-26 VITALS — BODY MASS INDEX: 29 KG/M2

## 2018-12-26 DIAGNOSIS — Z79.84: ICD-10-CM

## 2018-12-26 DIAGNOSIS — I25.10: ICD-10-CM

## 2018-12-26 DIAGNOSIS — F32.5: ICD-10-CM

## 2018-12-26 DIAGNOSIS — R04.0: ICD-10-CM

## 2018-12-26 DIAGNOSIS — I10: ICD-10-CM

## 2018-12-26 DIAGNOSIS — E78.00: ICD-10-CM

## 2018-12-26 DIAGNOSIS — E11.9: ICD-10-CM

## 2018-12-26 DIAGNOSIS — J40: Primary | ICD-10-CM

## 2018-12-26 LAB
ALBUMIN SERPL-MCNC: 3.4 G/DL (ref 3.4–5)
ALP SERPL-CCNC: 154 U/L (ref 45–117)
ALT SERPL-CCNC: 40 U/L (ref 13–61)
ANION GAP SERPL CALC-SCNC: 7 MMOL/L (ref 8–16)
AST SERPL-CCNC: 21 U/L (ref 15–37)
BASOPHILS # BLD: 0.9 % (ref 0–2)
BILIRUB SERPL-MCNC: 0.3 MG/DL (ref 0.2–1)
BUN SERPL-MCNC: 12 MG/DL (ref 7–18)
CALCIUM SERPL-MCNC: 8.3 MG/DL (ref 8.5–10.1)
CHLORIDE SERPL-SCNC: 107 MMOL/L (ref 98–107)
CO2 SERPL-SCNC: 28 MMOL/L (ref 21–32)
CREAT SERPL-MCNC: 0.6 MG/DL (ref 0.55–1.3)
DEPRECATED RDW RBC AUTO: 14.7 % (ref 11.6–15.6)
EOSINOPHIL # BLD: 3.8 % (ref 0–4.5)
GLUCOSE SERPL-MCNC: 107 MG/DL (ref 74–106)
HCT VFR BLD CALC: 36.6 % (ref 32.4–45.2)
HGB BLD-MCNC: 12.8 GM/DL (ref 10.7–15.3)
LYMPHOCYTES # BLD: 34.7 % (ref 8–40)
MCH RBC QN AUTO: 29.5 PG (ref 25.7–33.7)
MCHC RBC AUTO-ENTMCNC: 35 G/DL (ref 32–36)
MCV RBC: 84.3 FL (ref 80–96)
MONOCYTES # BLD AUTO: 6.3 % (ref 3.8–10.2)
NEUTROPHILS # BLD: 54.3 % (ref 42.8–82.8)
PLATELET # BLD AUTO: 233 K/MM3 (ref 134–434)
PMV BLD: 9.1 FL (ref 7.5–11.1)
POTASSIUM SERPLBLD-SCNC: 3.9 MMOL/L (ref 3.5–5.1)
PROT SERPL-MCNC: 6.6 G/DL (ref 6.4–8.2)
RBC # BLD AUTO: 4.34 M/MM3 (ref 3.6–5.2)
SODIUM SERPL-SCNC: 142 MMOL/L (ref 136–145)
WBC # BLD AUTO: 7.1 K/MM3 (ref 4–10)

## 2018-12-26 PROCEDURE — 3E0337Z INTRODUCTION OF ELECTROLYTIC AND WATER BALANCE SUBSTANCE INTO PERIPHERAL VEIN, PERCUTANEOUS APPROACH: ICD-10-PCS

## 2018-12-26 PROCEDURE — 3E033GC INTRODUCTION OF OTHER THERAPEUTIC SUBSTANCE INTO PERIPHERAL VEIN, PERCUTANEOUS APPROACH: ICD-10-PCS

## 2018-12-26 NOTE — PDOC
History of Present Illness





- History of Present Illness


Initial Comments: 


This patient is a 58 year old female with PMHx of HLD, HTN,  non insulin 

independent diabetes, and depression, herniated disc a lumbar spine status post 

spinal fusion 2 months ago, who presents with 1 week of chest congestion, cough

, and nosebleed. Patient states that she has been coughing a lot recently and 

has been having intermittent left nostril epistaxis. 





Denies any fevers or chills. 





Allergies: NKDA


Past surgical history: Appendectomy 


Social history: None reported


PCP: None reported





12/26/18 04:36








<Danielle Peoples - Last Filed: 12/26/18 04:36>





- General


History Source: Patient


Exam Limitations: No Limitations





<Jacinto Barkley - Last Filed: 12/26/18 04:52>





- General


Chief Complaint: Nasal Bleeding


Stated Complaint: EPISTAXIS


Time Seen by Provider: 12/26/18 02:08





Past History





<Danielle Peoples - Last Filed: 12/26/18 04:36>





- Past Medical History


Anemia: No


Asthma: No


Cancer: No


Cardiac Disorders: No


CVA: No


COPD: No


CHF: No


DVT: No


Dementia: No


Diabetes: Yes (NIDDM)


GI Disorders: No


 Disorders: Yes (INCONTINENCE)


HTN: Yes


Hypercholesterolemia: Yes


Liver Disease: No


Psychiatric Problems: Yes (Depression)


Seizures: No


Thyroid Disease: No





- Surgical History


Appendectomy: Yes





- Immunization History


Immunization Up to Date: Yes





- Suicide/Smoking/Psychosocial Hx


Smoking History: Never smoked


Have you smoked in the past 12 months: No


Information on smoking cessation initiated: No


Hx Alcohol Use: No


Drug/Substance Use Hx: No


Substance Use Type: None


Hx Substance Use Treatment: No





<Jacinto Barkley - Last Filed: 12/26/18 04:52>





- Past Medical History


Allergies/Adverse Reactions: 


 Allergies











Allergy/AdvReac Type Severity Reaction Status Date / Time


 


No Known Drug Allergies Allergy   Verified 12/26/18 02:12











Home Medications: 


Ambulatory Orders





Aspirin Coated [Ecotrin -] 81 mg PO DAILY 01/31/14 


Chlorthalidone 25 mg PO ASDIR 05/20/18 


Escitalopram Oxalate [Lexapro -] 10 mg PO DAILY 05/20/18 


Gabapentin 300 mg PO ASDIR 05/20/18 


Lisinopril [Zestril] 2.5 mg PO ASDIR 05/20/18 


metFORMIN HCL [Metformin HCl] 850 mg PO ASDIR 05/20/18 


Gabapentin [Neurontin] 300 mg PO Q8H PRN #20 capsule 11/19/18 


Methocarbamol [Robaxin -] 500 mg PO TID #21 tablet 11/19/18 


Azithromycin 250 mg PO DAILY #4 tablet 12/26/18 


Ondansetron HCl [Zofran] 4 mg PO Q8H PRN #12 tablet 12/26/18 











**Review of Systems





- Review of Systems


Comments:: 


GENERAL/CONSTITUTIONAL: No fever or chills. No weakness.


HEAD, EYES, EARS, NOSE AND THROAT: +left nostril epistaxis. No change in 

vision. No ear pain or discharge. No sore throat.


CARDIOVASCULAR: No chest pain or shortness of breath.


RESPIRATORY: +cough, +congestion. No wheezing, or hemoptysis.


GASTROINTESTINAL: No nausea, vomiting, diarrhea or constipation.


GENITOURINARY: No dysuria, frequency, or change in urination.


MUSCULOSKELETAL: No joint or muscle swelling or pain. No neck or back pain.


SKIN: No rash


NEUROLOGIC: No headache, vertigo, loss of consciousness, or change in strength/

sensation.


ENDOCRINE: No increased thirst. No abnormal weight change.


HEMATOLOGIC/LYMPHATIC: No anemia, easy bleeding, or history of blood clots.


ALLERGIC/IMMUNOLOGIC: No hives or skin allergy.





12/26/18 04:36








<Danielle Peoples - Last Filed: 12/26/18 04:36>





*Physical Exam





- Vital Signs


 Last Vital Signs











Temp Pulse Resp BP Pulse Ox


 


 98.2 F   74   19   145/76   99 


 


 12/26/18 00:46  12/26/18 00:46  12/26/18 00:46  12/26/18 00:46  12/26/18 00:46














- Physical Exam


Comments: 


GENERAL: Awake, alert, and fully oriented, in no acute distress


HEAD: No signs of trauma


EYES: PERRLA, EOMI, sclera anicteric, conjunctiva clear


ENT: Left medial septal irritation, no active bleeding. Auricles normal 

inspection, hearing grossly normal, oropharynx clear without exudates. Moist 

mucosa


NECK: Normal ROM, supple, no lymphadenopathy, JVD, or masses


NEUROLOGICAL: Cranial nerves II through XII grossly intact.  Normal speech, 

normal gait


SKIN: Warm, Dry, normal turgor, no rashes or lesions noted.





12/26/18 04:37








<Danielle Peoples - Last Filed: 12/26/18 04:36>





- Vital Signs


 Last Vital Signs











Temp Pulse Resp BP Pulse Ox


 


 98.2 F   74   19   145/76   99 


 


 12/26/18 00:46  12/26/18 00:46  12/26/18 00:46  12/26/18 00:46  12/26/18 00:46














<Jacinto Barkley - Last Filed: 12/26/18 04:52>





Moderate Sedation





- Procedure Monitoring


Vital Signs: 


Procedure Monitoring Vital Signs











Temperature  98.2 F   12/26/18 00:46


 


Pulse Rate  74   12/26/18 00:46


 


Respiratory Rate  19   12/26/18 00:46


 


Blood Pressure  145/76   12/26/18 00:46


 


O2 Sat by Pulse Oximetry (%)  99   12/26/18 00:46











<Danielle Peoples - Last Filed: 12/26/18 04:36>





- Procedure Monitoring


Vital Signs: 


Procedure Monitoring Vital Signs











Temperature  98.2 F   12/26/18 00:46


 


Pulse Rate  74   12/26/18 00:46


 


Respiratory Rate  19   12/26/18 00:46


 


Blood Pressure  145/76   12/26/18 00:46


 


O2 Sat by Pulse Oximetry (%)  99   12/26/18 00:46











<Jacinto Barkley - Last Filed: 12/26/18 04:52>





ED Treatment Course





- LABORATORY


CBC & Chemistry Diagram: 


 12/26/18 02:53





 12/26/18 02:53





- ADDITIONAL ORDERS


Additional order review: 


 Laboratory  Results











  12/26/18





  02:53


 


Sodium  142


 


Potassium  3.9


 


Chloride  107


 


Carbon Dioxide  28


 


Anion Gap  7 L


 


BUN  12


 


Creatinine  0.6


 


Creat Clearance w eGFR  > 60


 


Random Glucose  107 H


 


Calcium  8.3 L


 


Total Bilirubin  0.3


 


AST  21


 


ALT  40


 


Alkaline Phosphatase  154 H


 


Total Protein  6.6


 


Albumin  3.4








 











  12/26/18





  02:53


 


RBC  4.34


 


MCV  84.3


 


MCHC  35.0


 


RDW  14.7


 


MPV  9.1


 


Neutrophils %  54.3  D


 


Lymphocytes %  34.7  D


 


Monocytes %  6.3


 


Eosinophils %  3.8  D


 


Basophils %  0.9  D














- Medications


Given in the ED: 


ED Medications














Discontinued Medications














Generic Name Dose Route Start Last Admin





  Trade Name Freq  PRN Reason Stop Dose Admin


 


Sodium Chloride  1,000 mls @ 1,000 mls/hr  12/26/18 02:15  12/26/18 03:33





  Normal Saline -  IV  12/26/18 03:14  1,000 mls/hr





  ASDIR STA   Administration





     





     





     





     


 


Sodium Chloride  3 ml  12/26/18 02:15  12/26/18 03:33





  Normal Saline For Inhalation -  IH  12/26/18 02:16  3 ml





  ONCE ONE   Administration





     





     





     





     














<Danielle Peoples - Last Filed: 12/26/18 04:36>





- LABORATORY


CBC & Chemistry Diagram: 


 12/26/18 02:53





 12/26/18 02:53





- ADDITIONAL ORDERS


Additional order review: 


 Laboratory  Results











  12/26/18





  02:53


 


Sodium  142


 


Potassium  3.9


 


Chloride  107


 


Carbon Dioxide  28


 


Anion Gap  7 L


 


BUN  12


 


Creatinine  0.6


 


Creat Clearance w eGFR  > 60


 


Random Glucose  107 H


 


Calcium  8.3 L


 


Total Bilirubin  0.3


 


AST  21


 


ALT  40


 


Alkaline Phosphatase  154 H


 


Total Protein  6.6


 


Albumin  3.4








 











  12/26/18





  02:53


 


RBC  4.34


 


MCV  84.3


 


MCHC  35.0


 


RDW  14.7


 


MPV  9.1


 


Neutrophils %  54.3  D


 


Lymphocytes %  34.7  D


 


Monocytes %  6.3


 


Eosinophils %  3.8  D


 


Basophils %  0.9  D














- RADIOLOGY


Radiology Studies Ordered: 














 Category Date Time Status


 


 CHEST PA & LAT [RAD] Stat Radiology  12/26/18 02:15 Taken














- Medications


Given in the ED: 


ED Medications














Discontinued Medications














Generic Name Dose Route Start Last Admin





  Trade Name Freq  PRN Reason Stop Dose Admin


 


Sodium Chloride  1,000 mls @ 1,000 mls/hr  12/26/18 02:15  12/26/18 03:33





  Normal Saline -  IV  12/26/18 03:14  1,000 mls/hr





  ASDIR STA   Administration





     





     





     





     


 


Sodium Chloride  3 ml  12/26/18 02:15  12/26/18 03:33





  Normal Saline For Inhalation -  IH  12/26/18 02:16  3 ml





  ONCE ONE   Administration





     





     





     





     














<Jacinto Barkley - Last Filed: 12/26/18 04:52>





Medical Decision Making





- Medical Decision Making





12/26/18 03:47





A portion of this note was documented by scribe services under my direction. I 

have reviewed the details of the note, within reason, and agree with the 

documentation with the following case summary and management plan written by 

me. 





Patient treated in the ED.





Nursing notes are reviewed and incorporated into the medical decision-making.


Vital signs reviewed.





Peripheral IV access obtained by the nurse, laboratory studies are drawn and 

sent, reviewed and interpreted by myself. 





Vital Signs











Temp Pulse Resp BP Pulse Ox


 


 98.2 F   74   19   145/76   99 


 


 12/26/18 00:46  12/26/18 00:46  12/26/18 00:46  12/26/18 00:46  12/26/18 00:46








58-year-old female with past medical history of hypertension, diabetes, 

hyperlipidemia, coronary disease, depression presents with 1 week of chest 

congestion, cough. Denies fevers or chills. But reports that the patient 

coughing frequently. Head noted in the last day that she's been having 

intermittent left nostril epistaxis. Because the symptoms have worsened, the 

patient came to the ER for further evaluation.





I suspect the patient's epistaxis secondary to dry air and to the patient's URI 

symptoms. I suspect the patient may have bronchitis. We'll obtain chest x-ray 

to rule out pneumonia. Labs and reassess. Patient's currently not having 

bleeding but will observe.


12/26/18 04:48





 CBC, BMP





 12/26/18 02:53 





 12/26/18 02:53 





 CMP











Sodium  142 mmol/L (136-145)   12/26/18  02:53    


 


Potassium  3.9 mmol/L (3.5-5.1)   12/26/18  02:53    


 


Chloride  107 mmol/L ()   12/26/18  02:53    


 


Carbon Dioxide  28 mmol/L (21-32)   12/26/18  02:53    


 


Anion Gap  7 MMOL/L (8-16)  L  12/26/18  02:53    


 


BUN  12 mg/dL (7-18)   12/26/18  02:53    


 


Creatinine  0.6 mg/dL (0.55-1.3)   12/26/18  02:53    


 


Creat Clearance w eGFR  > 60  (>60)   12/26/18  02:53    


 


Random Glucose  107 mg/dL ()  H  12/26/18  02:53    


 


Calcium  8.3 mg/dL (8.5-10.1)  L  12/26/18  02:53    


 


Total Bilirubin  0.3 mg/dL (0.2-1)   12/26/18  02:53    


 


AST  21 U/L (15-37)   12/26/18  02:53    


 


ALT  40 U/L (13-61)   12/26/18  02:53    


 


Alkaline Phosphatase  154 U/L ()  H  12/26/18  02:53    


 


Total Protein  6.6 g/dl (6.4-8.2)   12/26/18  02:53    


 


Albumin  3.4 g/dl (3.4-5.0)   12/26/18  02:53    








Chest xray reviewed by me, pending official radiology read. No infiltrates.


Will treat as bronchitis.


Discharge with azithromycin.


I encouraged the patient to buy a humidifier.


No further bleeding noted.


I instructed the patient that if she has a mild nosebleed to use a spray of 

afrin and then squeeze her nose.


If it's severe, the patient should come back to the ER.





<Jacinto Barkley - Last Filed: 12/26/18 04:52>





*DC/Admit/Observation/Transfer





- Attestations


Scribe Attestion: 





12/26/18 04:38





Documentation prepared by Danielle Peoples, acting as medical scribe for Jacinto Barkley MD.





<Danielle Peoples - Last Filed: 12/26/18 04:36>





- Discharge Dispostion


Decision to Admit order: No





<Jacinto Barkley - Last Filed: 12/26/18 04:52>


Diagnosis at time of Disposition: 


 Bronchitis, Epistaxis








- Discharge Dispostion


Disposition: HOME


Condition at time of disposition: Good





- Prescriptions


Prescriptions: 


Azithromycin 250 mg PO DAILY #4 tablet


Ondansetron HCl [Zofran] 4 mg PO Q8H PRN #12 tablet


 PRN Reason: Nausea





- Referrals


Referrals: 


Eulalia Bond [Primary Care Provider] - 





- Patient Instructions


Printed Discharge Instructions:  DI for Nosebleed, DI for Acute Bronchitis


Additional Instructions: 


Please take the azithromycin as prescribed.


Please buy a humidifier and use it.


This will help with your symptoms.


If you notice a mild nosebleed, please use a spray of afrin and pinch your nose.


If the bleeding is a lot, please return to the ER.


If you have nausea, take a tablet of zofran every 8 hours as needed.


Follow up with your doctor.


Print Language: Croatian





- Post Discharge Activity

## 2019-12-12 ENCOUNTER — HOSPITAL ENCOUNTER (EMERGENCY)
Dept: HOSPITAL 74 - JER | Age: 59
Discharge: HOME | End: 2019-12-12
Payer: COMMERCIAL

## 2019-12-12 VITALS — HEART RATE: 73 BPM | TEMPERATURE: 98.1 F | SYSTOLIC BLOOD PRESSURE: 130 MMHG | DIASTOLIC BLOOD PRESSURE: 76 MMHG

## 2019-12-12 VITALS — BODY MASS INDEX: 30.7 KG/M2

## 2019-12-12 DIAGNOSIS — R19.7: ICD-10-CM

## 2019-12-12 DIAGNOSIS — R11.2: Primary | ICD-10-CM

## 2019-12-12 LAB
ALBUMIN SERPL-MCNC: 3.6 G/DL (ref 3.4–5)
ALP SERPL-CCNC: 171 U/L (ref 45–117)
ALT SERPL-CCNC: 50 U/L (ref 13–61)
ANION GAP SERPL CALC-SCNC: 4 MMOL/L (ref 8–16)
APPEARANCE UR: (no result)
AST SERPL-CCNC: 33 U/L (ref 15–37)
BACTERIA # UR AUTO: 203.6 /HPF
BASOPHILS # BLD: 0.6 % (ref 0–2)
BILIRUB SERPL-MCNC: 0.4 MG/DL (ref 0.2–1)
BILIRUB UR STRIP.AUTO-MCNC: NEGATIVE MG/DL
BUN SERPL-MCNC: 10.5 MG/DL (ref 7–18)
CALCIUM SERPL-MCNC: 8.8 MG/DL (ref 8.5–10.1)
CASTS URNS QL MICRO: 9 /LPF (ref 0–8)
CHLORIDE SERPL-SCNC: 105 MMOL/L (ref 98–107)
CO2 SERPL-SCNC: 32 MMOL/L (ref 21–32)
COLOR UR: (no result)
CREAT SERPL-MCNC: 0.6 MG/DL (ref 0.55–1.3)
DEPRECATED RDW RBC AUTO: 14.4 % (ref 11.6–15.6)
EOSINOPHIL # BLD: 2 % (ref 0–4.5)
EPITH CASTS URNS QL MICRO: 9.4 /HPF
GLUCOSE SERPL-MCNC: 124 MG/DL (ref 74–106)
HCT VFR BLD CALC: 41.2 % (ref 32.4–45.2)
HGB BLD-MCNC: 13.8 GM/DL (ref 10.7–15.3)
KETONES UR QL STRIP: (no result)
LEUKOCYTE ESTERASE UR QL STRIP.AUTO: (no result)
LIPASE SERPL-CCNC: 64 U/L (ref 73–393)
LYMPHOCYTES # BLD: 30.5 % (ref 8–40)
MAGNESIUM SERPL-MCNC: 2.1 MG/DL (ref 1.8–2.4)
MCH RBC QN AUTO: 29.4 PG (ref 25.7–33.7)
MCHC RBC AUTO-ENTMCNC: 33.5 G/DL (ref 32–36)
MCV RBC: 87.6 FL (ref 80–96)
MONOCYTES # BLD AUTO: 7.7 % (ref 3.8–10.2)
NEUTROPHILS # BLD: 59.2 % (ref 42.8–82.8)
NITRITE UR QL STRIP: NEGATIVE
PH UR: 6.5 [PH] (ref 5–8)
PLATELET # BLD AUTO: 203 K/MM3 (ref 134–434)
PMV BLD: 9.3 FL (ref 7.5–11.1)
POTASSIUM SERPLBLD-SCNC: 4.1 MMOL/L (ref 3.5–5.1)
PROT SERPL-MCNC: 6.6 G/DL (ref 6.4–8.2)
PROT UR QL STRIP: NEGATIVE
PROT UR QL STRIP: NEGATIVE
RBC # BLD AUTO: 4.71 M/MM3 (ref 3.6–5.2)
RBC # BLD AUTO: 7 /HPF (ref 0–4)
SODIUM SERPL-SCNC: 141 MMOL/L (ref 136–145)
SP GR UR: 1.02 (ref 1.01–1.03)
UROBILINOGEN UR STRIP-MCNC: 1 MG/DL (ref 0.2–1)
WBC # BLD AUTO: 4.6 K/MM3 (ref 4–10)
WBC # UR AUTO: 5 /HPF (ref 0–5)

## 2019-12-12 PROCEDURE — 3E033GC INTRODUCTION OF OTHER THERAPEUTIC SUBSTANCE INTO PERIPHERAL VEIN, PERCUTANEOUS APPROACH: ICD-10-PCS | Performed by: EMERGENCY MEDICINE

## 2019-12-12 PROCEDURE — 3E033NZ INTRODUCTION OF ANALGESICS, HYPNOTICS, SEDATIVES INTO PERIPHERAL VEIN, PERCUTANEOUS APPROACH: ICD-10-PCS | Performed by: EMERGENCY MEDICINE

## 2019-12-12 NOTE — PDOC
History of Present Illness





- General


Chief Complaint: Vomiting/Diarrhea


Stated Complaint: VOMITING,DIARRHEA


Time Seen by Provider: 12/12/19 07:54


History Source: Patient


Exam Limitations: No Limitations





- History of Present Illness


Travel History: No


Initial Comments: 





12/12/19 09:14


59-year-old female presents to ED with nausea vomiting and diarrhea since 

yesterday now associated with fatigue, mild dizziness, and upper abdominal 

cramping. patient denies any recent travel recent illness or recent change in 

medications.  Patient is a diabetic and states her glucose yesterday morning 

was 142.  Patient has no urinary complaints, fever, chills, rash, recent 

constipation, chest pain, shortness of breath or cough.


Timing/Duration: reports: intermittent


Quality: reports: mild, cramping


Abdominal Pain Onset Location: reports: RUQ, LUQ, epigastric (Burning sensation)


Pain Radiation: reports: no radiation


Aggravating Factors: improves with: None


Alleviating Factors: improves with: None





Past History





- Travel


Traveled outside of the country in the last 30 days: No


Close contact w/someone who was outside of country & ill: No





- Past Medical History


Allergies/Adverse Reactions: 


 Allergies











Allergy/AdvReac Type Severity Reaction Status Date / Time


 


No Known Drug Allergies Allergy   Verified 12/12/19 07:44











Home Medications: 


Ambulatory Orders





Aspirin Coated [Ecotrin -] 81 mg PO DAILY 01/31/14 


Lisinopril [Zestril] 10 mg PO DAILY 05/20/18 


metFORMIN HCL [Metformin HCl] 750 mg PO DAILY 05/20/18 


Gabapentin [Neurontin] 300 mg PO Q8H PRN #20 capsule 11/19/18 


Atorvastatin Ca [Lipitor] 20 mg PO HS 12/12/19 


Diclofenac/Hyaluronate/Niacin [Diclofen 3%-Hyaluron 2%-Niac4%] 30 gm TP ASDIR 12 /12/19 


Ergocalciferol [Vitamin D2] 50,000 unit PO Q7D@1000 12/12/19 


Famotidine [Pepcid -] 20 mg PO DAILY 12/12/19 


Linaclotide [Linzess] 72 mcg PO DAILY 12/12/19 


Sumatriptan Succinate [Imitrex -] 50 mg PO ASDIR PRN 12/12/19 


Topiramate [Topiramate ER] 25 mg PO DAILY 12/12/19 








Anemia: No


Asthma: No


Cancer: No


Cardiac Disorders: No


CVA: No


COPD: No


CHF: No


DVT: No


Dementia: No


Diabetes: Yes (NIDDM)


GI Disorders: No


 Disorders: Yes (INCONTINENCE)


HTN: Yes


Hypercholesterolemia: Yes


Liver Disease: No


Psychiatric Problems: Yes (Depression)


Seizures: No


Thyroid Disease: No





- Surgical History


Appendectomy: Yes





- Immunization History


Immunization Up to Date: Yes





- Psycho Social/Smoking Cessation Hx


Smoking History: Never smoked


Have you smoked in the past 12 months: No


Hx Alcohol Use: No


Drug/Substance Use Hx: No


Substance Use Type: None


Hx Substance Use Treatment: No


Patient Lives Alone: No


Lives with/in: spouse/SO





**Review of Systems





- Review of Systems


Able to Perform ROS?: Yes


Constitutional: Yes: Loss of Appetite, Weakness


HEENTM: No: Symptoms Reported


Respiratory: No: Symptoms reported


ABD/GI: Yes: Diarrhea, Nausea, Poor Appetite, Poor Fluid Intake, Vomiting, 

Abdominal cramping


: No: Symptoms Reported


Musculoskeletal: No: Symptoms Reported


Integumentary: No: Symptoms Reported


Neurological: Yes: Weakness


Endocrine: No: Symptoms Reported


Hematologic/Lymphatic: No: Symptoms Reported





*Physical Exam





- Vital Signs


 Last Vital Signs











Temp Pulse Resp BP Pulse Ox


 


 98.2 F   89   18   114/73   97 


 


 12/12/19 07:48  12/12/19 07:48  12/12/19 07:48  12/12/19 07:48  12/12/19 07:48














- Physical Exam


General Appearance: Yes: Nourished, Appropriately Dressed.  No: Apparent 

Distress


HEENT: positive: EOMI, SUAD.  negative: Pale Conjunctivae


Neck: positive: Normal Thyroid, Supple


Respiratory/Chest: positive: Lungs Clear, Normal Breath Sounds.  negative: 

Respiratory Distress, Accessory Muscle Use


Cardiovascular: positive: Regular Rhythm, Regular Rate.  negative: Murmur


Gastrointestinal/Abdominal: positive: Soft, Tenderness (epigastric)


Musculoskeletal: negative: CVA Tenderness


Extremity: positive: Normal Inspection


Integumentary: positive: Normal Color, Warm, Moist


Neurologic: positive: Motor Strength 5/5 (ambulatory)





ED Treatment Course





- LABORATORY


CBC & Chemistry Diagram: 


 12/12/19 08:20





 12/12/19 08:20





- ADDITIONAL ORDERS


Additional order review: 


 











  12/12/19





  08:20


 


RBC  4.71


 


MCV  87.6


 


MCHC  33.5


 


RDW  14.4


 


MPV  9.3


 


Neutrophils %  59.2


 


Lymphocytes %  30.5


 


Monocytes %  7.7


 


Eosinophils %  2.0


 


Basophils %  0.6














- Medications


Given in the ED: 


ED Medications














Discontinued Medications














Generic Name Dose Route Start Last Admin





  Trade Name Erick  PRN Reason Stop Dose Admin


 


Acetaminophen  1,000 mg  12/12/19 08:04  12/12/19 08:10





  Ofirmev Injection -  IVPB  12/12/19 08:05  1,000 mg





  ONCE ONE   Administration





     





     





     





     


 


Ondansetron HCl  4 mg  12/12/19 08:03  12/12/19 08:10





  Zofran Injection  IVPUSH  12/12/19 08:04  4 mg





  ONCE ONE   Administration





     





     





     





     


 


Pantoprazole Sodium  40 mg  12/12/19 08:04  12/12/19 08:10





  Protonix Iv  IVPUSH  12/12/19 08:05  40 mg





  ONCE ONE   Administration





     





     





     





     














Medical Decision Making





- Medical Decision Making





12/12/19 09:20


Chief complaint: Nausea vomiting and diarrhea and abdominal cramping since 

yesterday.   yesterday


Exam: Patient with epigastric tenderness vital signs stable.  No active 

vomiting here in the ER.


Plan: Labs, urine, medication and will reevaluate shortly


12/12/19 10:12


 Laboratory Tests











  12/12/19 12/12/19 12/12/19





  08:20 08:20 08:20


 


WBC  4.6  


 


Hgb  13.8  


 


Hct  41.2  


 


Absolute Neuts (auto)  2.7  


 


Sodium   141 


 


Potassium   4.1 


 


Chloride   105 


 


Carbon Dioxide   32 


 


Anion Gap   4 L 


 


BUN   10.5 


 


Creatinine   0.6 


 


Est GFR (CKD-EPI)NonAf   100.47 


 


Random Glucose   124 H 


 


Calcium   8.8 


 


Magnesium   2.1 


 


AST   33 


 


Alkaline Phosphatase   171 H 


 


Total Protein   6.6 


 


Albumin   3.6 


 


Lipase   64 L 


 


Urine Ketones    Trace H


 


Urine Nitrite    Negative


 


Ur Leukocyte Esterase    Trace


 


Urine WBC (Auto)    5








Patient has no urinary complaints.  Urine culture sent.  Patient states feeling 

much better and wanting to go home.  Will discharge home with Zofran and 

recommendations to eat bland food , and rest, drinking plenty of fluids.





Discharge





- Discharge Information


Problems reviewed: Yes


Clinical Impression/Diagnosis: 


 Nausea vomiting and diarrhea





Condition: Improved


Disposition: HOME





- Follow up/Referral


Referrals: 


Eulalia Bond [Primary Care Provider] - 





- Patient Discharge Instructions


Patient Printed Discharge Instructions:  Nausea and Vomiting-Adult, DI for 

Diarrhea and Traveler's Diarrhea -- Adult


Additional Instructions: 


Please eat bland food for the next 48 hours then may advance as tolerated.


Take Zofran as needed for nausea.


Rest.


Follow-up with your primary care doctor.


Check your sugar routinely





- Post Discharge Activity

## 2023-11-27 ENCOUNTER — OFFICE (OUTPATIENT)
Dept: URBAN - METROPOLITAN AREA CLINIC 30 | Facility: CLINIC | Age: 63
Setting detail: OPHTHALMOLOGY
End: 2023-11-27
Payer: COMMERCIAL

## 2023-11-27 DIAGNOSIS — E11.9: ICD-10-CM

## 2023-11-27 DIAGNOSIS — H52.4: ICD-10-CM

## 2023-11-27 DIAGNOSIS — E11.3293: ICD-10-CM

## 2023-11-27 PROCEDURE — 92250 FUNDUS PHOTOGRAPHY W/I&R: CPT | Performed by: OPHTHALMOLOGY

## 2023-11-27 PROCEDURE — 92004 COMPRE OPH EXAM NEW PT 1/>: CPT | Performed by: OPHTHALMOLOGY

## 2023-11-27 PROCEDURE — 92015 DETERMINE REFRACTIVE STATE: CPT | Performed by: OPHTHALMOLOGY

## 2023-11-27 ASSESSMENT — REFRACTION_MANIFEST
OD_SPHERE: -0.50
OS_SPHERE: PLANO
OD_CYLINDER: SPH
OS_CYLINDER: SPH
OS_ADD: +2.25
OD_ADD: +2.25
OD_VA1: 20/30
OS_VA1: 20/30

## 2023-11-27 ASSESSMENT — REFRACTION_AUTOREFRACTION
OS_AXIS: 165
OD_CYLINDER: +0.75
OS_SPHERE: -0.50
OS_CYLINDER: +0.75
OD_SPHERE: -1.25
OD_AXIS: 73

## 2023-11-27 ASSESSMENT — SPHEQUIV_DERIVED
OS_SPHEQUIV: -0.125
OD_SPHEQUIV: -0.875

## 2023-11-27 ASSESSMENT — REFRACTION_CURRENTRX
OD_AXIS: 55
OS_OVR_VA: 20/
OD_SPHERE: -0.50
OS_AXIS: 35
OS_SPHERE: -0.25
OD_OVR_VA: 20/
OD_CYLINDER: +0.25
OS_CYLINDER: +0.25

## 2023-11-27 ASSESSMENT — CONFRONTATIONAL VISUAL FIELD TEST (CVF)
OD_FINDINGS: FULL
OS_FINDINGS: FULL

## 2025-06-19 ENCOUNTER — OFFICE (OUTPATIENT)
Facility: LOCATION | Age: 65
Setting detail: OPHTHALMOLOGY
End: 2025-06-19
Payer: COMMERCIAL

## 2025-06-19 DIAGNOSIS — H11.153: ICD-10-CM

## 2025-06-19 DIAGNOSIS — H40.013: ICD-10-CM

## 2025-06-19 DIAGNOSIS — H16.223: ICD-10-CM

## 2025-06-19 DIAGNOSIS — E11.9: ICD-10-CM

## 2025-06-19 DIAGNOSIS — H40.053: ICD-10-CM

## 2025-06-19 DIAGNOSIS — H47.233: ICD-10-CM

## 2025-06-19 DIAGNOSIS — H25.13: ICD-10-CM

## 2025-06-19 DIAGNOSIS — H53.10: ICD-10-CM

## 2025-06-19 PROCEDURE — 92014 COMPRE OPH EXAM EST PT 1/>: CPT | Performed by: OPHTHALMOLOGY

## 2025-06-19 PROCEDURE — 92202 OPSCPY EXTND ON/MAC DRAW: CPT | Performed by: OPHTHALMOLOGY

## 2025-06-19 PROCEDURE — 92015 DETERMINE REFRACTIVE STATE: CPT | Performed by: OPHTHALMOLOGY

## 2025-06-19 PROCEDURE — 76514 ECHO EXAM OF EYE THICKNESS: CPT | Performed by: OPHTHALMOLOGY

## 2025-06-19 PROCEDURE — 92133 CPTRZD OPH DX IMG PST SGM ON: CPT | Performed by: OPHTHALMOLOGY

## 2025-06-19 ASSESSMENT — REFRACTION_MANIFEST
OD_AXIS: 075
OS_SPHERE: PLANO
OS_ADD: +2.25
OD_CYLINDER: +0.75
OD_VA1: 20/30
OD_ADD: +2.25
OS_SPHERE: PLANO
OD_SPHERE: -1.00
OS_VA1: 20/30
OD_CYLINDER: SPH
OS_CYLINDER: SPH
OD_SPHERE: -0.50
OD_VA1: 20/30

## 2025-06-19 ASSESSMENT — REFRACTION_AUTOREFRACTION
OS_SPHERE: PL
OD_CYLINDER: +0.75
OD_AXIS: 75
OS_CYLINDER: SPH
OD_SPHERE: -1.00

## 2025-06-19 ASSESSMENT — PACHYMETRY
OS_CT_CORRECTION: 1
OS_CT_UM: 526
OD_CT_UM: 528
OD_CT_CORRECTION: 1

## 2025-06-19 ASSESSMENT — VISUAL ACUITY
OD_BCVA: 20/40-1
OS_BCVA: 20/50

## 2025-06-19 ASSESSMENT — REFRACTION_CURRENTRX
OD_AXIS: 55
OS_AXIS: 35
OS_SPHERE: -0.25
OS_CYLINDER: +0.25
OD_OVR_VA: 20/
OD_CYLINDER: +0.25
OD_SPHERE: -0.50
OS_OVR_VA: 20/

## 2025-06-19 ASSESSMENT — TEAR BREAK UP TIME (TBUT)
OS_TBUT: 2+
OD_TBUT: 2+

## 2025-06-19 ASSESSMENT — CONFRONTATIONAL VISUAL FIELD TEST (CVF)
OS_FINDINGS: FULL
OD_FINDINGS: FULL